# Patient Record
Sex: FEMALE | Race: WHITE | NOT HISPANIC OR LATINO
[De-identification: names, ages, dates, MRNs, and addresses within clinical notes are randomized per-mention and may not be internally consistent; named-entity substitution may affect disease eponyms.]

---

## 2017-01-24 ENCOUNTER — CHART COPY (OUTPATIENT)
Age: 67
End: 2017-01-24

## 2017-10-20 ENCOUNTER — APPOINTMENT (OUTPATIENT)
Dept: ORTHOPEDIC SURGERY | Facility: CLINIC | Age: 67
End: 2017-10-20

## 2018-07-16 ENCOUNTER — RESULT REVIEW (OUTPATIENT)
Age: 68
End: 2018-07-16

## 2019-03-12 ENCOUNTER — EMERGENCY (EMERGENCY)
Facility: HOSPITAL | Age: 69
LOS: 1 days | Discharge: HOME | End: 2019-03-12
Attending: EMERGENCY MEDICINE
Payer: MEDICARE

## 2019-03-12 VITALS
RESPIRATION RATE: 22 BRPM | WEIGHT: 125 LBS | HEART RATE: 69 BPM | SYSTOLIC BLOOD PRESSURE: 154 MMHG | TEMPERATURE: 99 F | OXYGEN SATURATION: 98 % | HEIGHT: 68 IN | DIASTOLIC BLOOD PRESSURE: 95 MMHG

## 2019-03-12 DIAGNOSIS — Z87.81 PERSONAL HISTORY OF (HEALED) TRAUMATIC FRACTURE: Chronic | ICD-10-CM

## 2019-03-12 DIAGNOSIS — Z98.89 OTHER SPECIFIED POSTPROCEDURAL STATES: Chronic | ICD-10-CM

## 2019-03-12 PROCEDURE — 99284 EMERGENCY DEPT VISIT MOD MDM: CPT

## 2019-03-12 NOTE — ED ADULT TRIAGE NOTE - CHIEF COMPLAINT QUOTE
Swelling and deformity of left wrist. denies any recent injury or fall. recently had repair in January for fracture to wrist

## 2019-03-13 VITALS
HEART RATE: 94 BPM | SYSTOLIC BLOOD PRESSURE: 171 MMHG | RESPIRATION RATE: 20 BRPM | OXYGEN SATURATION: 97 % | TEMPERATURE: 98 F | DIASTOLIC BLOOD PRESSURE: 94 MMHG

## 2019-03-13 PROCEDURE — 73110 X-RAY EXAM OF WRIST: CPT | Mod: 26,LT

## 2019-03-13 PROCEDURE — 73110 X-RAY EXAM OF WRIST: CPT

## 2019-03-13 PROCEDURE — 73100 X-RAY EXAM OF WRIST: CPT | Mod: 26,59,LT

## 2019-03-13 PROCEDURE — 99284 EMERGENCY DEPT VISIT MOD MDM: CPT | Mod: 25

## 2019-03-13 PROCEDURE — 25605 CLTX DST RDL FX/EPHYS SEP W/: CPT | Mod: LT

## 2019-03-13 PROCEDURE — 73100 X-RAY EXAM OF WRIST: CPT

## 2019-03-13 RX ORDER — OXYCODONE AND ACETAMINOPHEN 5; 325 MG/1; MG/1
1 TABLET ORAL
Qty: 12 | Refills: 0
Start: 2019-03-13 | End: 2019-03-15

## 2019-03-13 RX ORDER — HYDROMORPHONE HYDROCHLORIDE 2 MG/ML
0.5 INJECTION INTRAMUSCULAR; INTRAVENOUS; SUBCUTANEOUS ONCE
Qty: 0 | Refills: 0 | Status: DISCONTINUED | OUTPATIENT
Start: 2019-03-13 | End: 2019-03-13

## 2019-03-13 RX ADMIN — HYDROMORPHONE HYDROCHLORIDE 0.5 MILLIGRAM(S): 2 INJECTION INTRAMUSCULAR; INTRAVENOUS; SUBCUTANEOUS at 02:46

## 2019-03-13 NOTE — ED PROVIDER NOTE - OBJECTIVE STATEMENT
67 y/o F pt with hx of arthritis, HTN, hypothyroidism, nad R wrist fracture presents to ED c/o L wrist pain and swelling that onset suddenly at 16:00. Pt states there was no new trauma to the area today. She reports a fall in January, injured L wrist, but she had a negative X-ray at urgent care at that time. Pt is R-handed. Denies fever, chills, CP, SOB, abd pain, n/v/d, numbness and tingling. No further complaints at this time.

## 2019-03-13 NOTE — ED ADULT NURSE NOTE - OBJECTIVE STATEMENT
pt alert and oriente x4 came in c/o pain, swelling echymosis and deformity to left wrist. pt denies trauma to wrist. respirations even unlabored. pt educated on plan of care, pt able to successfully teach back plan of care to RN, RN will continue to reeducate pt during hospital stay.

## 2019-03-13 NOTE — CONSULT NOTE ADULT - SUBJECTIVE AND OBJECTIVE BOX
ORTHO-HAND SERVICE    Pt Name: SUSANNAH LEE    MRN: [id number]      Patient is a 68y Female presenting to the emergency department with a chief complaint of left wrist pain/deformity x 1 day. Pt states that she was home when she suddenly noticed pain/deformity of her left wrist x 4 hours ago. Pt denies any recent falls/trauma and states that she is unsure of how she caused this injury to her wrist. Pt otherwise denies numbness/tingling and has no other complaints.      Patient presents for evaluation of Left distal radius fracture.      REVIEW OF SYSTEMS    General: Alert, responsive, in NAD    Skin/Breast: No rashes, no pruritis   	  Ophthalmologic: No visual changes. No redness.   	  ENMT:	No discharge. No swelling.    Respiratory and Thorax: No difficulty breathing. No cough.  	   Cardiovascular: No chest pain. No palpitations.    Gastrointestinal: No abdominal pain. No diarrhea.     Genitourinary: No dysuria. No bleeding.    Musculoskeletal: SEE HPI.    Neurological: No sensory or motor changes.     Psychiatric: No anxiety or depression.    Hematology/Lymphatics: No swelling.    Endocrine: No Hx of diabetes.    ROS is otherwise negative.    PAST MEDICAL & SURGICAL HISTORY:  PAST MEDICAL & SURGICAL HISTORY:  Anticardiolipin antibody positive  Urinary tract infection: 1/31/16 finished bactrim  Arthritis  Psoriasis  Hypothyroid  Hypertension  Avascular necrosis: left knee  H/O: knee surgery: right knee cyst  History of wrist fracture: right wrist orif      Allergies: No Known Allergies      Medications:     FAMILY HISTORY:  Family history of renal cancer (Mother)  : non-contributory    Social History: Denies ETOH abuse.      Daily Height in cm: 172.72 (12 Mar 2019 23:33)    Daily                     PHYSICAL EXAM:      Appearance: Alert, responsive, in no acute distress.    Left upper extremity: Sensation is grossly intact to light touch. no rash on visible skin. Skin is clean, dry and intact. No bleeding. No abrasions. No ulcerations. Well healed scar and the distal volar wrist from previous injury. +diffuse ecchymosis of the distal wrist with moderate swelling noted. 2+ distal radiaul pulse. Cap refill < 2 sec. No signs of venous  insufficiency  or stasis. No extremity ulcerations. No cyanosis. Decreased ROM of the wrist due to reported pain/injury. +flexion/extension of shoulder, +flexion/extension of elbow, +flexion/extension/abduction/adduction of all digits, No TTP noted in shoudler/elbow/forearm/digits. Compartments soft and compressible.       Imaging Studies: All images reviewed with Dr. Johnson.    Procedure: FRACTURE REDUCTION  PROCEDURE NOTE: Fracture reduction     Performed by:  Ashish Paulino PA-C    Indication: Acute fracture with displacement, requiring fracture reduction.    Consent: The risks and benefits of the procedure including incomplete reduction, nerve damage and bleeding were explained and the patient verbalized their understanding and wished to proceed with the procedure. Written consent was obtained following the discussion.    Universal Protocol: a time out was performed and the correct patient and site were verified     Procedure: Neurovascular exam intact prior to fracture reduction.  Skin exam : No bleeding or lacerations at the fracture site. Anesthesia/pain control, using aseptic technique, was administered using a hematoma block of 10 ml of 1% lidocaine. Reduction of the left distal raidus was accomplished via axial traction and careful manipulation. Following adequate reduction and alignment of the fractured bone, the fracture was immobilized with a well padded sugar tong plaster splint & sling. Distally, the extremity was neurovascular intact following the procedure.  The patient tolerated the procedure well.    Post reduction films obtained and demonstrated an adequate reduction.    Complications: None     A/P:  Pt is a  68y Female with a left comminuted distal radius fracture of unknown mechanism.    PLAN d/w Dr. Johnson:   * Adequate reduction achieved and well padded splint/sling applied of the LUE  * NWB of the LUE   * Maintain splint at all times, do not wet  * Sling for comfort  * Recommend strict elevation of the LUE  * Follow up with Dr. Perez in the office within 1 week, call tomorrow to schedule appointment  * Return to the ED for any concerning symptoms as discussed bedside with patient & daughter including increased pain, swelling, numbness/tingling, change in color of fingers.

## 2019-03-13 NOTE — ED PROVIDER NOTE - PMH
Anticardiolipin antibody positive    Arthritis    Avascular necrosis  left knee  Hypertension    Hypothyroid    Psoriasis    Urinary tract infection  1/31/16 finished bactrim

## 2019-03-13 NOTE — ED PROVIDER NOTE - PROGRESS NOTE DETAILS
PA NOTE: PA called to the bedside by attending and ORTHO PA to assist in perform procedure PA completed as described in proc note, meds and xrays ordered above, care was transferred back to attending after completion. Post-reduction films seen by Ortho Attending/Dr. Johnson  and pt cleared for d/c with f/u as outpt

## 2019-03-19 ENCOUNTER — APPOINTMENT (OUTPATIENT)
Dept: ORTHOPEDIC SURGERY | Facility: CLINIC | Age: 69
End: 2019-03-19
Payer: MEDICARE

## 2019-03-19 VITALS
SYSTOLIC BLOOD PRESSURE: 163 MMHG | HEIGHT: 65 IN | HEART RATE: 97 BPM | WEIGHT: 145 LBS | BODY MASS INDEX: 24.16 KG/M2 | DIASTOLIC BLOOD PRESSURE: 83 MMHG

## 2019-03-19 PROCEDURE — 99214 OFFICE O/P EST MOD 30 MIN: CPT | Mod: 25

## 2019-03-19 PROCEDURE — 73110 X-RAY EXAM OF WRIST: CPT | Mod: LT

## 2019-03-19 PROCEDURE — 29075 APPL CST ELBW FNGR SHORT ARM: CPT | Mod: LT

## 2019-03-19 RX ORDER — AMOXICILLIN 500 MG/1
500 CAPSULE ORAL
Qty: 20 | Refills: 4 | Status: DISCONTINUED | COMMUNITY
Start: 2017-01-24 | End: 2019-03-19

## 2019-03-20 ENCOUNTER — OTHER (OUTPATIENT)
Age: 69
End: 2019-03-20

## 2019-03-20 RX ORDER — TRAMADOL HYDROCHLORIDE 50 MG/1
50 TABLET, COATED ORAL
Qty: 12 | Refills: 0 | Status: ACTIVE | COMMUNITY
Start: 2019-03-20 | End: 1900-01-01

## 2019-03-22 ENCOUNTER — APPOINTMENT (OUTPATIENT)
Dept: CT IMAGING | Facility: CLINIC | Age: 69
End: 2019-03-22
Payer: MEDICARE

## 2019-03-22 ENCOUNTER — APPOINTMENT (OUTPATIENT)
Dept: CT IMAGING | Facility: CLINIC | Age: 69
End: 2019-03-22

## 2019-03-22 ENCOUNTER — OUTPATIENT (OUTPATIENT)
Dept: OUTPATIENT SERVICES | Facility: HOSPITAL | Age: 69
LOS: 1 days | End: 2019-03-22
Payer: MEDICARE

## 2019-03-22 DIAGNOSIS — Z87.81 PERSONAL HISTORY OF (HEALED) TRAUMATIC FRACTURE: Chronic | ICD-10-CM

## 2019-03-22 DIAGNOSIS — S52.532A COLLES' FRACTURE OF LEFT RADIUS, INITIAL ENCOUNTER FOR CLOSED FRACTURE: ICD-10-CM

## 2019-03-22 DIAGNOSIS — Z98.89 OTHER SPECIFIED POSTPROCEDURAL STATES: Chronic | ICD-10-CM

## 2019-03-22 PROCEDURE — 76377 3D RENDER W/INTRP POSTPROCES: CPT

## 2019-03-22 PROCEDURE — 76377 3D RENDER W/INTRP POSTPROCES: CPT | Mod: 26

## 2019-03-22 PROCEDURE — 73200 CT UPPER EXTREMITY W/O DYE: CPT | Mod: 26,LT

## 2019-03-22 PROCEDURE — 73200 CT UPPER EXTREMITY W/O DYE: CPT

## 2019-03-25 ENCOUNTER — OTHER (OUTPATIENT)
Age: 69
End: 2019-03-25

## 2019-04-02 ENCOUNTER — APPOINTMENT (OUTPATIENT)
Dept: ORTHOPEDIC SURGERY | Facility: CLINIC | Age: 69
End: 2019-04-02
Payer: MEDICARE

## 2019-04-02 PROCEDURE — 73110 X-RAY EXAM OF WRIST: CPT | Mod: LT

## 2019-04-02 PROCEDURE — 99214 OFFICE O/P EST MOD 30 MIN: CPT

## 2019-05-07 ENCOUNTER — APPOINTMENT (OUTPATIENT)
Dept: ORTHOPEDIC SURGERY | Facility: CLINIC | Age: 69
End: 2019-05-07
Payer: MEDICARE

## 2019-05-07 DIAGNOSIS — S52.532A COLLES' FRACTURE OF LEFT RADIUS, INITIAL ENCOUNTER FOR CLOSED FRACTURE: ICD-10-CM

## 2019-05-07 PROCEDURE — 99214 OFFICE O/P EST MOD 30 MIN: CPT

## 2019-05-07 PROCEDURE — 73110 X-RAY EXAM OF WRIST: CPT | Mod: LT

## 2019-06-17 ENCOUNTER — APPOINTMENT (OUTPATIENT)
Dept: ORTHOPEDIC SURGERY | Facility: CLINIC | Age: 69
End: 2019-06-17

## 2022-11-26 ENCOUNTER — EMERGENCY (EMERGENCY)
Facility: HOSPITAL | Age: 72
LOS: 1 days | Discharge: HOME | End: 2022-11-26
Attending: EMERGENCY MEDICINE
Payer: MEDICARE

## 2022-11-26 VITALS
SYSTOLIC BLOOD PRESSURE: 116 MMHG | RESPIRATION RATE: 18 BRPM | OXYGEN SATURATION: 95 % | DIASTOLIC BLOOD PRESSURE: 77 MMHG | WEIGHT: 160.06 LBS | TEMPERATURE: 98 F | HEIGHT: 65 IN | HEART RATE: 98 BPM

## 2022-11-26 DIAGNOSIS — Z98.89 OTHER SPECIFIED POSTPROCEDURAL STATES: Chronic | ICD-10-CM

## 2022-11-26 DIAGNOSIS — Z87.81 PERSONAL HISTORY OF (HEALED) TRAUMATIC FRACTURE: Chronic | ICD-10-CM

## 2022-11-26 LAB
ALBUMIN SERPL ELPH-MCNC: 4.7 G/DL — SIGNIFICANT CHANGE UP (ref 3.3–5.2)
ALP SERPL-CCNC: 83 U/L — SIGNIFICANT CHANGE UP (ref 40–120)
ALT FLD-CCNC: 11 U/L — SIGNIFICANT CHANGE UP
ANION GAP SERPL CALC-SCNC: 13 MMOL/L — SIGNIFICANT CHANGE UP (ref 5–17)
APTT BLD: 30.4 SEC — SIGNIFICANT CHANGE UP (ref 27.5–35.5)
AST SERPL-CCNC: 19 U/L — SIGNIFICANT CHANGE UP
BASOPHILS # BLD AUTO: 0.09 K/UL — SIGNIFICANT CHANGE UP (ref 0–0.2)
BASOPHILS NFR BLD AUTO: 0.9 % — SIGNIFICANT CHANGE UP (ref 0–2)
BILIRUB SERPL-MCNC: 0.3 MG/DL — LOW (ref 0.4–2)
BUN SERPL-MCNC: 20.9 MG/DL — HIGH (ref 8–20)
CALCIUM SERPL-MCNC: 9.1 MG/DL — SIGNIFICANT CHANGE UP (ref 8.4–10.5)
CHLORIDE SERPL-SCNC: 103 MMOL/L — SIGNIFICANT CHANGE UP (ref 96–108)
CO2 SERPL-SCNC: 23 MMOL/L — SIGNIFICANT CHANGE UP (ref 22–29)
CREAT SERPL-MCNC: 0.8 MG/DL — SIGNIFICANT CHANGE UP (ref 0.5–1.3)
EGFR: 79 ML/MIN/1.73M2 — SIGNIFICANT CHANGE UP
EOSINOPHIL # BLD AUTO: 0.15 K/UL — SIGNIFICANT CHANGE UP (ref 0–0.5)
EOSINOPHIL NFR BLD AUTO: 1.4 % — SIGNIFICANT CHANGE UP (ref 0–6)
FLUAV AG NPH QL: SIGNIFICANT CHANGE UP
FLUBV AG NPH QL: SIGNIFICANT CHANGE UP
GLUCOSE SERPL-MCNC: 96 MG/DL — SIGNIFICANT CHANGE UP (ref 70–99)
HCT VFR BLD CALC: 45.6 % — HIGH (ref 34.5–45)
HGB BLD-MCNC: 14.7 G/DL — SIGNIFICANT CHANGE UP (ref 11.5–15.5)
IMM GRANULOCYTES NFR BLD AUTO: 0.5 % — SIGNIFICANT CHANGE UP (ref 0–0.9)
INR BLD: 1.12 RATIO — SIGNIFICANT CHANGE UP (ref 0.88–1.16)
LYMPHOCYTES # BLD AUTO: 2.08 K/UL — SIGNIFICANT CHANGE UP (ref 1–3.3)
LYMPHOCYTES # BLD AUTO: 20.1 % — SIGNIFICANT CHANGE UP (ref 13–44)
MCHC RBC-ENTMCNC: 28.1 PG — SIGNIFICANT CHANGE UP (ref 27–34)
MCHC RBC-ENTMCNC: 32.2 GM/DL — SIGNIFICANT CHANGE UP (ref 32–36)
MCV RBC AUTO: 87.2 FL — SIGNIFICANT CHANGE UP (ref 80–100)
MONOCYTES # BLD AUTO: 0.53 K/UL — SIGNIFICANT CHANGE UP (ref 0–0.9)
MONOCYTES NFR BLD AUTO: 5.1 % — SIGNIFICANT CHANGE UP (ref 2–14)
NEUTROPHILS # BLD AUTO: 7.47 K/UL — HIGH (ref 1.8–7.4)
NEUTROPHILS NFR BLD AUTO: 72 % — SIGNIFICANT CHANGE UP (ref 43–77)
PLATELET # BLD AUTO: 395 K/UL — SIGNIFICANT CHANGE UP (ref 150–400)
POTASSIUM SERPL-MCNC: 4.2 MMOL/L — SIGNIFICANT CHANGE UP (ref 3.5–5.3)
POTASSIUM SERPL-SCNC: 4.2 MMOL/L — SIGNIFICANT CHANGE UP (ref 3.5–5.3)
PROT SERPL-MCNC: 7.6 G/DL — SIGNIFICANT CHANGE UP (ref 6.6–8.7)
PROTHROM AB SERPL-ACNC: 13 SEC — SIGNIFICANT CHANGE UP (ref 10.5–13.4)
RBC # BLD: 5.23 M/UL — HIGH (ref 3.8–5.2)
RBC # FLD: 13.2 % — SIGNIFICANT CHANGE UP (ref 10.3–14.5)
RSV RNA NPH QL NAA+NON-PROBE: SIGNIFICANT CHANGE UP
SARS-COV-2 RNA SPEC QL NAA+PROBE: SIGNIFICANT CHANGE UP
SODIUM SERPL-SCNC: 139 MMOL/L — SIGNIFICANT CHANGE UP (ref 135–145)
WBC # BLD: 10.37 K/UL — SIGNIFICANT CHANGE UP (ref 3.8–10.5)
WBC # FLD AUTO: 10.37 K/UL — SIGNIFICANT CHANGE UP (ref 3.8–10.5)

## 2022-11-26 PROCEDURE — 72192 CT PELVIS W/O DYE: CPT | Mod: 26,MG

## 2022-11-26 PROCEDURE — G1004: CPT

## 2022-11-26 PROCEDURE — 99220: CPT

## 2022-11-26 PROCEDURE — 93971 EXTREMITY STUDY: CPT | Mod: 26,LT

## 2022-11-26 RX ORDER — NICOTINE POLACRILEX 2 MG
1 GUM BUCCAL DAILY
Refills: 0 | Status: DISCONTINUED | OUTPATIENT
Start: 2022-11-26 | End: 2022-12-03

## 2022-11-26 RX ORDER — LIDOCAINE 4 G/100G
1 CREAM TOPICAL DAILY
Refills: 0 | Status: DISCONTINUED | OUTPATIENT
Start: 2022-11-26 | End: 2022-12-03

## 2022-11-26 RX ORDER — SERTRALINE 25 MG/1
100 TABLET, FILM COATED ORAL DAILY
Refills: 0 | Status: DISCONTINUED | OUTPATIENT
Start: 2022-11-26 | End: 2022-12-03

## 2022-11-26 RX ORDER — OXYCODONE HYDROCHLORIDE 5 MG/1
10 TABLET ORAL EVERY 8 HOURS
Refills: 0 | Status: DISCONTINUED | OUTPATIENT
Start: 2022-11-26 | End: 2022-11-26

## 2022-11-26 RX ORDER — LEVOTHYROXINE SODIUM 125 MCG
50 TABLET ORAL DAILY
Refills: 0 | Status: DISCONTINUED | OUTPATIENT
Start: 2022-11-26 | End: 2022-12-03

## 2022-11-26 RX ORDER — IBUPROFEN 200 MG
600 TABLET ORAL EVERY 6 HOURS
Refills: 0 | Status: DISCONTINUED | OUTPATIENT
Start: 2022-11-26 | End: 2022-12-03

## 2022-11-26 RX ORDER — HYDROMORPHONE HYDROCHLORIDE 2 MG/ML
1 INJECTION INTRAMUSCULAR; INTRAVENOUS; SUBCUTANEOUS ONCE
Refills: 0 | Status: DISCONTINUED | OUTPATIENT
Start: 2022-11-26 | End: 2022-11-26

## 2022-11-26 RX ORDER — METHOCARBAMOL 500 MG/1
750 TABLET, FILM COATED ORAL THREE TIMES A DAY
Refills: 0 | Status: DISCONTINUED | OUTPATIENT
Start: 2022-11-26 | End: 2022-12-03

## 2022-11-26 RX ORDER — MORPHINE SULFATE 50 MG/1
4 CAPSULE, EXTENDED RELEASE ORAL ONCE
Refills: 0 | Status: DISCONTINUED | OUTPATIENT
Start: 2022-11-26 | End: 2022-11-26

## 2022-11-26 RX ORDER — OXYCODONE HYDROCHLORIDE 5 MG/1
5 TABLET ORAL EVERY 6 HOURS
Refills: 0 | Status: DISCONTINUED | OUTPATIENT
Start: 2022-11-26 | End: 2022-11-27

## 2022-11-26 RX ADMIN — SERTRALINE 100 MILLIGRAM(S): 25 TABLET, FILM COATED ORAL at 21:48

## 2022-11-26 RX ADMIN — OXYCODONE HYDROCHLORIDE 10 MILLIGRAM(S): 5 TABLET ORAL at 21:46

## 2022-11-26 RX ADMIN — LIDOCAINE 1 PATCH: 4 CREAM TOPICAL at 22:45

## 2022-11-26 RX ADMIN — Medication 10 MILLIGRAM(S): at 23:53

## 2022-11-26 RX ADMIN — HYDROMORPHONE HYDROCHLORIDE 1 MILLIGRAM(S): 2 INJECTION INTRAMUSCULAR; INTRAVENOUS; SUBCUTANEOUS at 16:09

## 2022-11-26 RX ADMIN — Medication 50 MICROGRAM(S): at 22:04

## 2022-11-26 RX ADMIN — MORPHINE SULFATE 4 MILLIGRAM(S): 50 CAPSULE, EXTENDED RELEASE ORAL at 13:10

## 2022-11-26 RX ADMIN — HYDROMORPHONE HYDROCHLORIDE 1 MILLIGRAM(S): 2 INJECTION INTRAMUSCULAR; INTRAVENOUS; SUBCUTANEOUS at 15:54

## 2022-11-26 RX ADMIN — MORPHINE SULFATE 4 MILLIGRAM(S): 50 CAPSULE, EXTENDED RELEASE ORAL at 12:55

## 2022-11-26 RX ADMIN — Medication 600 MILLIGRAM(S): at 23:55

## 2022-11-26 RX ADMIN — METHOCARBAMOL 750 MILLIGRAM(S): 500 TABLET, FILM COATED ORAL at 21:48

## 2022-11-26 NOTE — ED ADULT TRIAGE NOTE - CHIEF COMPLAINT QUOTE
pt sent from urgent care due to left hip fracture. pt denies fall/ injury. pt states she started experiencing pain 5 days ago.

## 2022-11-26 NOTE — ED ADULT NURSE NOTE - NSICDXPASTMEDICALHX_GEN_ALL_CORE_FT
PAST MEDICAL HISTORY:  Anticardiolipin antibody positive     Arthritis     Avascular necrosis left knee    Hypertension     Hypothyroid     Psoriasis     Urinary tract infection 1/31/16 finished bactrim

## 2022-11-26 NOTE — ED CDU PROVIDER INITIAL DAY NOTE - MEDICAL DECISION MAKING DETAILS
73 yo F co L hip pain. Pt states that for the past 5 d she has had constant L hip pain. Pt states that the pain is lateral and worse with movement and has been unable to walk or stand. [facility] and was told it was possibly fx'd. negative CT     pain control-   PT eval   pain management  consult   case management   ambulate with assistant as tolerated  us left LE

## 2022-11-26 NOTE — ED PROVIDER NOTE - OBJECTIVE STATEMENT
pt is a 71 yo F co L hip pain. Pt states that for the past 5 d she has had constant L hip pain. Pt states that the pain is lateral and worse with movement and has been unable to walk or stand. [facility] and was told it was possibly fx'd. XR report reads as possible subtle femoral neck fx v. overlying bone.

## 2022-11-26 NOTE — ED CDU PROVIDER INITIAL DAY NOTE - PHYSICAL EXAMINATION
Const: AOX3 nontoxic appearing, no apparent respiratory or physical distress. lay on stretcher   Eyes: JEN. EOMI  Neck: Soft and supple. Full ROM without pain.  Cardiac: Regular rate and regular rhythm. +S1/S2. Peripheral LE pulses 2+ and symmetric. No LE edema.  Resp: Speaking in full sentences. No evidence of respiratory distress.   Abd: Soft, non-tender, non-distended. Normal bowel sounds in all 4 quadrants. No guarding or rebound.  Back: Spine midline and non-tender. No CVAT.  MSK: S.P left knee TKR  ROM grossly intact   LEFT HIP mild gluteal mild TTP . pain on ROM - DP pulse +2 b.l   Skin: No rashes, abrasions or lacerations.  Neuro: Awake, alert & oriented x 3. Moves all extremities symmetrically.

## 2022-11-26 NOTE — ED CDU PROVIDER INITIAL DAY NOTE - OBJECTIVE STATEMENT
71 yo F co L hip pain. Pt states that for the past 5 d she has had constant L hip pain. Pt states that the pain is lateral and worse with movement and has been unable to walk or stand. [facility] and was told it was possibly fx'd. XR report reads as possible subtle femoral neck fx v. overlying bone. she denies any fall or trauma or injury - she feeling heavy on the leg as well on walking with some numbness. she  was walking since then with pain . she took Advil that take off the edge of the pain

## 2022-11-26 NOTE — ED PROVIDER NOTE - OBSERVING MD:
Group Therapy Note    Start Time: 800  End Time:  342  Number of Participants: 13    Type of Group: Community Meeting       Patient's Goal:  \"My goals after release\"      Notes:        Participation Level:  Active Listener       Participation Quality: Appropriate      Thought Process/Content: Logical      Affective Functioning: Congruent      Mood: Calm      Level of consciousness:  Alert      Modes of Intervention: Support      Discipline Responsible: Behavioral Health Tech II      Signature:  [address] Esquivel

## 2022-11-26 NOTE — ED PROVIDER NOTE - PHYSICAL EXAMINATION
Constitutional - well-developed.   Head - NCAT. Airway patent.   Eyes - PERRL.   CV - RRR. no murmur. no edema.   Pulm - CTAB.   Abd - soft, nt. no rebound. no guarding.   Neuro - A&Ox3. strength 5/5 x4. sensation intact x4. normal gait.   Skin - No rash. .  MSK - pain on ROM of L hip.

## 2022-11-26 NOTE — ED ADULT NURSE NOTE - OBJECTIVE STATEMENT
Pt sent in from urgent care s/p non traumatic left hip fracture Pt sent in from urgent care s/p non traumatic left hip fracture.  Pt states pain began 5 days ago and denies any injury.  No obvious deformity noted to pt.  Pt unable to ambulate r/t pain.

## 2022-11-26 NOTE — ED ADULT NURSE NOTE - NSFALLRSKASSESASSIST_ED_ALL_ED
Quality 110: Preventive Care And Screening: Influenza Immunization: Influenza Immunization previously received during influenza season Quality 111:Pneumonia Vaccination Status For Older Adults: Pneumococcal Vaccination Previously Received Detail Level: Detailed yes

## 2022-11-26 NOTE — ED CDU PROVIDER INITIAL DAY NOTE - NS ED ATTENDING STATEMENT MOD
This was a shared visit with the GOYO. I reviewed and verified the documentation and independently performed the documented:

## 2022-11-26 NOTE — ED ADULT NURSE NOTE - NSICDXPASTSURGICALHX_GEN_ALL_CORE_FT
PAST SURGICAL HISTORY:  H/O: knee surgery right knee cyst    History of wrist fracture right wrist orif

## 2022-11-26 NOTE — ED ADULT NURSE NOTE - NSICDXFAMILYHX_GEN_ALL_CORE_FT
FAMILY HISTORY:  Mother  Still living? No  Family history of renal cancer, Age at diagnosis: Age Unknown

## 2022-11-26 NOTE — ED ADULT NURSE NOTE - NS ED NOTE ABUSE RESPONSE YN
History  Chief Complaint   Patient presents with    Diarrhea     Patient reports diarrhea and vomiting since Monday morning  Patient states she feels fine, she just wants a note to back to work tomorrow     HPI    None       History reviewed  No pertinent past medical history  History reviewed  No pertinent surgical history  History reviewed  No pertinent family history  I have reviewed and agree with the history as documented  Social History   Substance Use Topics    Smoking status: Current Every Day Smoker     Packs/day: 0 20     Types: Cigarettes    Smokeless tobacco: Never Used    Alcohol use Yes        Review of Systems    Physical Exam  ED Triage Vitals [02/21/18 2159]   Temperature Pulse Respirations Blood Pressure SpO2   98 3 °F (36 8 °C) 75 18 149/72 97 %      Temp Source Heart Rate Source Patient Position - Orthostatic VS BP Location FiO2 (%)   Oral Monitor Sitting Left arm --      Pain Score       5           Orthostatic Vital Signs  Vitals:    02/21/18 2159   BP: 149/72   Pulse: 75   Patient Position - Orthostatic VS: Sitting       Physical Exam   Constitutional: She is oriented to person, place, and time  She appears well-developed and well-nourished  No distress  Appears well hydrated   HENT:   Head: Normocephalic and atraumatic  Mouth/Throat: Oropharynx is clear and moist    Eyes: Conjunctivae are normal  Pupils are equal, round, and reactive to light  Neck: Normal range of motion  No tracheal deviation present  Cardiovascular: Normal rate, regular rhythm, normal heart sounds and intact distal pulses  Pulmonary/Chest: Effort normal and breath sounds normal  No respiratory distress  Abdominal: Soft  Bowel sounds are normal  She exhibits no distension  There is tenderness (very mild) in the epigastric area  There is no rigidity, no rebound, no guarding and negative Chino's sign  Neurological: She is alert and oriented to person, place, and time  She has normal strength  GCS eye subscore is 4  GCS verbal subscore is 5  GCS motor subscore is 6  Skin: Skin is warm and dry  Psychiatric: She has a normal mood and affect  Her behavior is normal    Nursing note and vitals reviewed  ED Medications  Medications   ondansetron (ZOFRAN-ODT) dispersible tablet 4 mg (not administered)       Diagnostic Studies  Results Reviewed     None                 No orders to display              Procedures  Procedures       Phone Contacts  ED Phone Contact    ED Course  ED Course                                MDM  Number of Diagnoses or Management Options  Nausea vomiting and diarrhea: new and does not require workup  Diagnosis management comments: This 81-year-old female who presents here today with nausea, vomiting, and diarrhea day, looking for a work note  She states she began vomiting and having diarrhea on 2/18  She states she is having both frequently, vomiting frequently after eating or drinking, and when she managed to eat it "goes right through me "  She had "low-grade" fevers earlier in the week, up to 100 5, but no other fevers  She has had mild nasal congestion, but no significant coughing  She denies dysuria or changes in her urine, and states her last menstrual period was about a week ago and was normal and regular for her  She has known exposure to influenza, but denies any known exposure to GI illnesses  She has been drinking ginger ale, and eating caramelized ginger candies which has been helping with her vomiting  She has not taken or done anything else for her symptoms  She does work in   She states she is doing somewhat better today that she has managed to keep down some fluids and food without vomiting  However, her place of employment says that she needs a note saying she does not have the flu, and that she can go back to work  She denies any underlying medical problems, known bad food exposures, or prior abdominal surgeries      ROS: Otherwise negative, unless stated as above  She is well-appearing, in no acute distress  She has minimal epigastric tenderness to palpation without Chino sign or peritoneal signs  The remainder of her exam is unremarkable  She does appear well hydrated given the reported amount vomiting and diarrhea she has been having  I discussed with her that given her primary GI symptoms and no significant fevers it is unlikely that she has influenza  However, as she works in , she should not be going to work where she can easily spread this illness to other people  Given her home lack of significant abdominal pain or tenderness I have lower suspicion for a pancreatitis, cholelithiasis, cholecystitis, diverticulitis, colitis  We will give her Zofran to help with her nausea and vomiting, and give her a prescription for this at home  I advised that she should not go back to work for at least 24 hours after she is done with nausea and vomiting, so cannot go back tomorrow, but as long as she is doing better can go back today after that  I discussed with her worsening or changing symptoms for which she should return, that suggest that there is more than just a viral GI illness contributing to her symptoms, follow-up, and she expresses understanding with this plan  CritCare Time    Disposition  Final diagnoses:   Nausea vomiting and diarrhea     Time reflects when diagnosis was documented in both MDM as applicable and the Disposition within this note     Time User Action Codes Description Comment    2/21/2018 11:06 PM Ivonne Shepherd Add [R11 2,  R19 7] Nausea vomiting and diarrhea       ED Disposition     ED Disposition Condition Comment    Discharge  Garth Cavanaugh discharge to home/self care      Condition at discharge: Good        Follow-up Information     Follow up With Specialties Details Why Contact Info    your primary care doctor  Schedule an appointment as soon as possible for a visit As needed, to follow up on your symptoms     Marie Yanez, DO Internal Medicine Schedule an appointment as soon as possible for a visit to set up care with a primary care doctor [address] [facility]          Patient's Medications   Discharge Prescriptions    ONDANSETRON (ZOFRAN-ODT) 4 MG DISINTEGRATING TABLET    Take 1 tablet (4 mg total) by mouth every 6 (six) hours as needed for nausea or vomiting       Start Date: 2/21/2018 End Date: --       Order Dose: 4 mg       Quantity: 20 tablet    Refills: 0     No discharge procedures on file      ED Provider  Electronically Signed by           Lisa Silva MD  02/21/18 4192 Yes

## 2022-11-26 NOTE — ED PROVIDER NOTE - ATTENDING CONTRIBUTION TO CARE
Pt with 5 d of constant L hip pain. Pt states that the pain is worse with movement.  Pt went to [facility] and was told it was fx'd and to come to the hospital.    physical - L hip with pain on ROM. nvi distally.    plan - ct reviewed and neg. will put in obs pending pt and pain management.

## 2022-11-26 NOTE — ED CDU PROVIDER INITIAL DAY NOTE - ATTENDING APP SHARED VISIT CONTRIBUTION OF CARE
72F p/w left hip pain, worse with movment and difficulty ambulating 2/2 pain.  xray concerning for possible fx.   EXAM:   L LE: ttp @ left hip, from/nt @ knee/ankle.  nv intact  A/P:  72yoF p/w left hip pain  -pain control, PT eval, CM consult

## 2022-11-27 VITALS
SYSTOLIC BLOOD PRESSURE: 110 MMHG | OXYGEN SATURATION: 95 % | DIASTOLIC BLOOD PRESSURE: 72 MMHG | HEART RATE: 81 BPM | TEMPERATURE: 98 F | RESPIRATION RATE: 18 BRPM

## 2022-11-27 PROCEDURE — 87637 SARSCOV2&INF A&B&RSV AMP PRB: CPT

## 2022-11-27 PROCEDURE — 86850 RBC ANTIBODY SCREEN: CPT

## 2022-11-27 PROCEDURE — 99284 EMERGENCY DEPT VISIT MOD MDM: CPT | Mod: 25

## 2022-11-27 PROCEDURE — 85610 PROTHROMBIN TIME: CPT

## 2022-11-27 PROCEDURE — 86900 BLOOD TYPING SEROLOGIC ABO: CPT

## 2022-11-27 PROCEDURE — 96374 THER/PROPH/DIAG INJ IV PUSH: CPT

## 2022-11-27 PROCEDURE — 72192 CT PELVIS W/O DYE: CPT | Mod: MG

## 2022-11-27 PROCEDURE — 86901 BLOOD TYPING SEROLOGIC RH(D): CPT

## 2022-11-27 PROCEDURE — 99217: CPT

## 2022-11-27 PROCEDURE — 85730 THROMBOPLASTIN TIME PARTIAL: CPT

## 2022-11-27 PROCEDURE — 36415 COLL VENOUS BLD VENIPUNCTURE: CPT

## 2022-11-27 PROCEDURE — G0378: CPT

## 2022-11-27 PROCEDURE — 80053 COMPREHEN METABOLIC PANEL: CPT

## 2022-11-27 PROCEDURE — G1004: CPT

## 2022-11-27 PROCEDURE — 93971 EXTREMITY STUDY: CPT

## 2022-11-27 PROCEDURE — 85025 COMPLETE CBC W/AUTO DIFF WBC: CPT

## 2022-11-27 RX ORDER — IBUPROFEN 200 MG
1 TABLET ORAL
Qty: 20 | Refills: 0
Start: 2022-11-27 | End: 2022-12-01

## 2022-11-27 RX ORDER — OXYCODONE AND ACETAMINOPHEN 5; 325 MG/1; MG/1
1 TABLET ORAL
Qty: 6 | Refills: 0
Start: 2022-11-27 | End: 2022-11-28

## 2022-11-27 RX ORDER — OXYCODONE AND ACETAMINOPHEN 5; 325 MG/1; MG/1
1 TABLET ORAL
Qty: 9 | Refills: 0
Start: 2022-11-27 | End: 2022-11-29

## 2022-11-27 RX ADMIN — LIDOCAINE 1 PATCH: 4 CREAM TOPICAL at 06:49

## 2022-11-27 RX ADMIN — METHOCARBAMOL 750 MILLIGRAM(S): 500 TABLET, FILM COATED ORAL at 05:53

## 2022-11-27 RX ADMIN — LIDOCAINE 1 PATCH: 4 CREAM TOPICAL at 13:16

## 2022-11-27 RX ADMIN — SERTRALINE 100 MILLIGRAM(S): 25 TABLET, FILM COATED ORAL at 13:51

## 2022-11-27 RX ADMIN — OXYCODONE HYDROCHLORIDE 5 MILLIGRAM(S): 5 TABLET ORAL at 05:53

## 2022-11-27 RX ADMIN — METHOCARBAMOL 750 MILLIGRAM(S): 500 TABLET, FILM COATED ORAL at 13:15

## 2022-11-27 RX ADMIN — Medication 600 MILLIGRAM(S): at 04:49

## 2022-11-27 RX ADMIN — OXYCODONE HYDROCHLORIDE 5 MILLIGRAM(S): 5 TABLET ORAL at 06:49

## 2022-11-27 RX ADMIN — OXYCODONE HYDROCHLORIDE 5 MILLIGRAM(S): 5 TABLET ORAL at 13:15

## 2022-11-27 RX ADMIN — LIDOCAINE 1 PATCH: 4 CREAM TOPICAL at 10:35

## 2022-11-27 RX ADMIN — Medication 10 MILLIGRAM(S): at 05:53

## 2022-11-27 NOTE — PHYSICAL THERAPY INITIAL EVALUATION ADULT - PERTINENT HX OF CURRENT PROBLEM, REHAB EVAL
71 yo F co L hip pain. Pt states that for the past 5 d she has had constant L hip pain. Pt states that the pain is lateral and worse with movement and has been unable to walk or stand. [facility] and was told it was possibly fx'd. negative CT

## 2022-11-27 NOTE — PHYSICAL THERAPY INITIAL EVALUATION ADULT - ADDITIONAL COMMENTS
Pt reports living alone in a house with 6-7 CINDI c 2 rails, and resides on the main level. Pt amb without device and is independent with functional mobility, ADLs, and IADLs (however reports with pain and difficulty). Pt drives and is retired. Pt has support of family (pt reports daughter can assist as needed- however works). Pt owns RW.

## 2022-11-27 NOTE — ED CDU PROVIDER DISPOSITION NOTE - NSFOLLOWUPINSTRUCTIONS_ED_ALL_ED_FT
- Return to the ED for any new or worsening symptoms.   - Use walker when ambulating  - Follow-up with Spine doctor listed

## 2022-11-27 NOTE — ED CDU PROVIDER SUBSEQUENT DAY NOTE - PHYSICAL EXAMINATION
Const: AOX3 nontoxic appearing, no apparent respiratory or physical distress. lay on stretcher   Eyes: JEN. EOMI  Neck: Soft and supple. Full ROM without pain.  Cardiac: Regular rate and regular rhythm. +S1/S2. Peripheral LE pulses 2+ and symmetric. No LE edema.  Resp: Speaking in full sentences. No evidence of respiratory distress.   Abd: Soft, non-tender, non-distended. Normal bowel sounds in all 4 quadrants. No guarding or rebound.  Back: Spine midline and non-tender. No CVAT.  MSK: S.P left knee TKR  ROM grossly intact   LEFT HIP mild gluteal mild TTP . pain on ROM - DP pulse +2 b.l equal   Skin: No rashes, abrasions or lacerations.  Neuro: Awake, alert & oriented x 3. Moves all extremities symmetrically.

## 2022-11-27 NOTE — ED CDU PROVIDER SUBSEQUENT DAY NOTE - MEDICAL DECISION MAKING DETAILS
71 yo F co L hip pain. Pt states that for the past 5 d she has had constant L hip pain. Pt states that the pain is lateral and worse with movement and has been unable to walk or stand. [facility] and was told it was possibly fx'd. negative CT     pain control-   PT eval   pain management  consult   case management   ambulate with assistant as tolerated  us left LE

## 2022-11-27 NOTE — ED CDU PROVIDER SUBSEQUENT DAY NOTE - HISTORY
pt is resting over night comfortably, pending Pian management and PT eval in AM . negative DVT study . vss over night

## 2022-11-27 NOTE — PHYSICAL THERAPY INITIAL EVALUATION ADULT - LEVEL OF INDEPENDENCE: SUPINE/SIT, REHAB EVAL
Initiate Treatment: 2% ketoconazole cream once daily
Continue Regimen: Fluconazole once weekly discussed sweating one hour after taking medication
Detail Level: Simple
independent

## 2022-11-27 NOTE — ED ADULT NURSE REASSESSMENT NOTE - NS ED NURSE REASSESS COMMENT FT1
PT at bedside for pt evaluation
Pt sleeping comfortably on stretcher.  No nonverbal indicators of pain present.  Respirations even and unlabored.  Awaiting further POC.  PIV wnl; flushing without difficulty.  In NAD, will continue to monitor.
report received from zakia BOWENS.  Pt resting comfortably on stretcher.  No complaints of pain.  Respirations even and unlabored.  Awaiting pain mgt and PT consult.  PIV wnl; flushing without difficulty.  In NAD, will continue to monitor.
Assumed care of pt at 2215 as stated in report from KWAN Hussein. Charting as noted. Patient A&O x4, denies pain/discomfort, denies CP/SOB. Updated on the plan of care. Call bell within reach, bed locked in lowest position. IV site flushed w/ NS. No redness, swelling or pain noted to site. No signs of acute distress noted, safety maintained.
pt resting comfortably showing no signs of respiratory distress or pain, the pt is calm and cooperative
pt resting comfortably showing no signs of respiratory distress or pain, the pt is calm and cooperative

## 2022-11-27 NOTE — PROVIDER CONTACT NOTE (OTHER) - RECOMMENDATIONS
Home c RW. Pt reports daughter works, however can provide assist as needed and standby for stairs as needed.

## 2022-11-27 NOTE — ED CDU PROVIDER SUBSEQUENT DAY NOTE - ATTENDING APP SHARED VISIT CONTRIBUTION OF CARE
Patient with atraumatic left hip pain.  No signs of fracture on imaging.  No signs of DVT.  No signs of infection.  Normal neurologic exam.  Evaluated by PT and cleared for discharge home with walker.  Will discharge with outpatient orthopedics follow-up and return precautions.  Patient comfortable with plan.

## 2022-11-27 NOTE — PROVIDER CONTACT NOTE (OTHER) - ASSESSMENT
Pt received on stretcher, pt amb and negotiated stairs, and returned with CBWR in NAD to stretcher.  Pt with Pre 10/10,  session  9/10, Post  9/10 pain levels at Left LE: RN Aware. Pt presents functionally independent, will not follow.

## 2022-11-27 NOTE — ED CDU PROVIDER DISPOSITION NOTE - CARE PROVIDER_API CALL
Ever Patel (DO)  Orthopaedic Surgery  [address] [place] [address]  Phone: [phone]  Fax: [phone]  Follow Up Time:

## 2022-12-05 ENCOUNTER — APPOINTMENT (OUTPATIENT)
Dept: ORTHOPEDIC SURGERY | Facility: CLINIC | Age: 72
End: 2022-12-05

## 2022-12-30 ENCOUNTER — APPOINTMENT (OUTPATIENT)
Dept: ORTHOPEDIC SURGERY | Facility: CLINIC | Age: 72
End: 2022-12-30
Payer: MEDICARE

## 2022-12-30 VITALS
DIASTOLIC BLOOD PRESSURE: 83 MMHG | BODY MASS INDEX: 24.16 KG/M2 | WEIGHT: 145 LBS | HEART RATE: 87 BPM | HEIGHT: 65 IN | SYSTOLIC BLOOD PRESSURE: 158 MMHG

## 2022-12-30 DIAGNOSIS — Z96.652 PRESENCE OF LEFT ARTIFICIAL KNEE JOINT: ICD-10-CM

## 2022-12-30 DIAGNOSIS — M16.12 UNILATERAL PRIMARY OSTEOARTHRITIS, LEFT HIP: ICD-10-CM

## 2022-12-30 PROCEDURE — 73501 X-RAY EXAM HIP UNI 1 VIEW: CPT

## 2022-12-30 PROCEDURE — 99203 OFFICE O/P NEW LOW 30 MIN: CPT

## 2022-12-30 RX ORDER — MELOXICAM 15 MG/1
15 TABLET ORAL
Qty: 30 | Refills: 1 | Status: ACTIVE | COMMUNITY
Start: 2022-12-30 | End: 1900-01-01

## 2023-01-04 ENCOUNTER — OUTPATIENT (OUTPATIENT)
Dept: OUTPATIENT SERVICES | Facility: HOSPITAL | Age: 73
LOS: 1 days | End: 2023-01-04
Payer: MEDICARE

## 2023-01-04 VITALS
DIASTOLIC BLOOD PRESSURE: 77 MMHG | HEART RATE: 86 BPM | RESPIRATION RATE: 16 BRPM | TEMPERATURE: 98 F | WEIGHT: 158.73 LBS | SYSTOLIC BLOOD PRESSURE: 132 MMHG | HEIGHT: 65 IN | OXYGEN SATURATION: 98 %

## 2023-01-04 DIAGNOSIS — Z29.9 ENCOUNTER FOR PROPHYLACTIC MEASURES, UNSPECIFIED: ICD-10-CM

## 2023-01-04 DIAGNOSIS — R76.8 OTHER SPECIFIED ABNORMAL IMMUNOLOGICAL FINDINGS IN SERUM: ICD-10-CM

## 2023-01-04 DIAGNOSIS — Z98.89 OTHER SPECIFIED POSTPROCEDURAL STATES: Chronic | ICD-10-CM

## 2023-01-04 DIAGNOSIS — Z01.818 ENCOUNTER FOR OTHER PREPROCEDURAL EXAMINATION: ICD-10-CM

## 2023-01-04 DIAGNOSIS — I10 ESSENTIAL (PRIMARY) HYPERTENSION: ICD-10-CM

## 2023-01-04 DIAGNOSIS — E03.9 HYPOTHYROIDISM, UNSPECIFIED: ICD-10-CM

## 2023-01-04 DIAGNOSIS — Z87.81 PERSONAL HISTORY OF (HEALED) TRAUMATIC FRACTURE: Chronic | ICD-10-CM

## 2023-01-04 DIAGNOSIS — M16.12 UNILATERAL PRIMARY OSTEOARTHRITIS, LEFT HIP: ICD-10-CM

## 2023-01-04 LAB
A1C WITH ESTIMATED AVERAGE GLUCOSE RESULT: 5.5 % — SIGNIFICANT CHANGE UP (ref 4–5.6)
ANION GAP SERPL CALC-SCNC: 12 MMOL/L — SIGNIFICANT CHANGE UP (ref 5–17)
APTT BLD: 27.2 SEC — LOW (ref 27.5–35.5)
BLD GP AB SCN SERPL QL: SIGNIFICANT CHANGE UP
BUN SERPL-MCNC: 25.3 MG/DL — HIGH (ref 8–20)
CALCIUM SERPL-MCNC: 9.1 MG/DL — SIGNIFICANT CHANGE UP (ref 8.4–10.5)
CHLORIDE SERPL-SCNC: 108 MMOL/L — SIGNIFICANT CHANGE UP (ref 96–108)
CO2 SERPL-SCNC: 23 MMOL/L — SIGNIFICANT CHANGE UP (ref 22–29)
CREAT SERPL-MCNC: 0.6 MG/DL — SIGNIFICANT CHANGE UP (ref 0.5–1.3)
EGFR: 96 ML/MIN/1.73M2 — SIGNIFICANT CHANGE UP
ESTIMATED AVERAGE GLUCOSE: 111 MG/DL — SIGNIFICANT CHANGE UP (ref 68–114)
GLUCOSE SERPL-MCNC: 108 MG/DL — HIGH (ref 70–99)
HCT VFR BLD CALC: 43 % — SIGNIFICANT CHANGE UP (ref 34.5–45)
HGB BLD-MCNC: 14.1 G/DL — SIGNIFICANT CHANGE UP (ref 11.5–15.5)
INR BLD: 1.09 RATIO — SIGNIFICANT CHANGE UP (ref 0.88–1.16)
MCHC RBC-ENTMCNC: 29.2 PG — SIGNIFICANT CHANGE UP (ref 27–34)
MCHC RBC-ENTMCNC: 32.8 GM/DL — SIGNIFICANT CHANGE UP (ref 32–36)
MCV RBC AUTO: 89 FL — SIGNIFICANT CHANGE UP (ref 80–100)
MRSA PCR RESULT.: SIGNIFICANT CHANGE UP
PLATELET # BLD AUTO: 339 K/UL — SIGNIFICANT CHANGE UP (ref 150–400)
POTASSIUM SERPL-MCNC: 4.4 MMOL/L — SIGNIFICANT CHANGE UP (ref 3.5–5.3)
POTASSIUM SERPL-SCNC: 4.4 MMOL/L — SIGNIFICANT CHANGE UP (ref 3.5–5.3)
PROTHROM AB SERPL-ACNC: 12.6 SEC — SIGNIFICANT CHANGE UP (ref 10.5–13.4)
RBC # BLD: 4.83 M/UL — SIGNIFICANT CHANGE UP (ref 3.8–5.2)
RBC # FLD: 13.7 % — SIGNIFICANT CHANGE UP (ref 10.3–14.5)
S AUREUS DNA NOSE QL NAA+PROBE: SIGNIFICANT CHANGE UP
SODIUM SERPL-SCNC: 143 MMOL/L — SIGNIFICANT CHANGE UP (ref 135–145)
WBC # BLD: 8.55 K/UL — SIGNIFICANT CHANGE UP (ref 3.8–10.5)
WBC # FLD AUTO: 8.55 K/UL — SIGNIFICANT CHANGE UP (ref 3.8–10.5)

## 2023-01-04 PROCEDURE — 93005 ELECTROCARDIOGRAM TRACING: CPT

## 2023-01-04 PROCEDURE — 93010 ELECTROCARDIOGRAM REPORT: CPT

## 2023-01-04 PROCEDURE — G0463: CPT

## 2023-01-04 RX ORDER — SERTRALINE 25 MG/1
1 TABLET, FILM COATED ORAL
Qty: 0 | Refills: 0 | DISCHARGE

## 2023-01-04 NOTE — H&P PST ADULT - NSICDXFAMILYHX_GEN_ALL_CORE_FT
Detail Level: Zone FAMILY HISTORY:  Mother  Still living? No  Family history of renal cancer, Age at diagnosis: Age Unknown     Continue Regimen: Benzaclin 1-5% Gel: AAA QAM {per pt, no refills needed at this time}; Tretinoin 0.1% Cream: AAA QHS {per pt, no refills needed at this time}; Doxycycline 100 mg: QHS w/ meal {refills sent to local pharmacy} Initiate Treatment: Clindamycin-benzoyl peroxide 1-5% gel: AAA QD Render In Strict Bullet Format?: No Plan: Patient to call when refills needed for Benzaclin/Tretinoin.

## 2023-01-04 NOTE — H&P PST ADULT - EKG AND INTERPRETATION
EKG reviewed, no acute changes, official reading pending. NSR 74, EKG reviewed, no acute changes, official reading pending.

## 2023-01-04 NOTE — H&P PST ADULT - MS GEN HX ROS MEA POS PC
Anesthesia Evaluation     Patient summary reviewed and Nursing notes reviewed   NPO Solid Status: > 6 hours  NPO Liquid Status: < 2 hours           Airway   Mallampati: II  TM distance: >3 FB  Neck ROM: full  No difficulty expected  Dental      Pulmonary - negative pulmonary ROS   Cardiovascular     (+) dysrhythmias Tachycardia,       Neuro/Psych  (+) headaches, psychiatric history Anxiety,     GI/Hepatic/Renal/Endo - negative ROS     Musculoskeletal (-) negative ROS    Abdominal    Substance History - negative use     OB/GYN    (+) Pregnant,         Other                        Anesthesia Plan    ASA 2     epidural       Anesthetic plan, all risks, benefits, and alternatives have been provided, discussed and informed consent has been obtained with: patient.  Use of blood products discussed with patient .      arthritis/joint pain

## 2023-01-04 NOTE — PHYSICAL EXAM
[de-identified] : The patient is conversive and in no apparent distress. The patient is alert and oriented to person, place, and time. Affect and mood appear normal. The head is normocephalic and atraumatic. Skin shows normal turgor with no evidence of eczema or psoriasis. No respiratory distress noted. Sensation grossly intact. MUSCULOSKELETAL:   SEE BELOW\par \par Left leg and knee exam demonstrates normal alignment of the knee.  The knee is normal temperature.  No effusions.  No tenderness to palpation.  No extensor mechanism lag.  No tenderness or defects of the quadriceps or patellar tendons.  Limitation is limited in a supine position because of hip pain.  In a seated position the patient has flexion greater than 100 degrees.  Left hip exam demonstrates no tenderness following the IT band or over the greater trochanteric bursa.  Passive range of motion of the left hip is markedly limited because of reproducible hip pain.  No calf tenderness.  Normal sensation to light touch distally.  2+ DP pulse.  No venous stasis or ulceration. [de-identified] : [facility], 2022 were reviewed.  Left knee implants are in good position.  Stemmed implants are noted.  No fractures or lytic lesions.  Pelvis and left hip x-rays were reviewed.  No hip fractures.  Advanced joint space narrowing centrally and laterally of the left hip appreciated.  Large acetabular osteophyte formation appreciated.\par \par CAT scan and Ultrasound from [facility] emergency department were reviewed.  No DVT was found on ultrasound.  CAT scan demonstrates advanced osteoarthritis of the left hip.  No fractures.

## 2023-01-04 NOTE — DISCUSSION/SUMMARY
[de-identified] : 30 minutes was spent reviewing the x-rays as well as discussing with the patient their clinical presentation, diagnosis and providing education.  All of the recent x-rays CAT scan and ultrasound imaging was reviewed with the patient.  The patient continues to remains clinically symptomatic.  The patient's pain is consistent with osteoarthritis of the left hip.  She is having difficulty with gentle motion and standing.  At this time she is recommended to proceed with a hip replacement to address the sudden change.  She will discontinue the use of ibuprofen in preparation for the hip surgery and spinal anesthesia.  She will obtain medical clearance.  She will limit her activity to reduce discomfort.\par \par The patient is a 72 year old individual with end stage arthritis of their left hip joint. Based upon the patient's continued symptoms and failure to respond to conservative treatment including continuous use of ibuprofen and activity modification, I have recommended a left anterior total hip arthroplasty for this patient. A long discussion took place with the patient describing what a total joint replacement is and what the procedure would entail. A hip model, similar to the implant that will be used during the operation, was utilized to demonstrate and to discuss the various bearing surfaces of the implants. The hospitalization and post-operative care and rehabilitation were also discussed. The use of perioperative antibiotics and DVT prophylaxis were discussed. The risk, benefits and alternatives to a surgical intervention were discussed at length with the patient. The patient was also advised of risks related to the medical comorbidities and elevated body mass index (BMI).  A lengthy discussion took place to review the most common complications including but not limited to: deep vein thrombosis, pulmonary embolus, heart attack, stroke, infection, wound breakdown, numbness, damage to nerves, tendon, muscles, arteries or other blood vessels, death and other possible complications from anesthesia. The patient was told that we will take steps to minimize these risks by using sterile technique, antibiotics and DVT prophylaxis when appropriate and follow the patient postoperatively in the office setting to monitor progress. The possibility of recurrent pain, no improvement in pain and actual worsening of pain were also discussed with the patient.\par The discharge plan of care focused on the patient going home following surgery.  The patient was encouraged to make the necessary arrangements to have someone stay with them when they are discharged home.  Following discharge, a home care nurse will visit the patient.  The home care nurse will open your home care case and request home physical therapy services.  Home physical therapy will commence following discharge provided it is appropriate and covered by the health insurance benefit plan. \par The benefits of surgery were discussed with the patient including the potential for improving his/her current clinical condition through operative intervention. Alternatives to surgical intervention including continued conservative management were also discussed in detail. All questions were answered to the satisfaction of the patient. The treatment plan of care, as well as a model of a total hip equivalent to the one that will be used for their total joint replacement, was shared with the patient.  The patient agreed to the plan of care as well as the use of implants in their total joint replacement.\par

## 2023-01-04 NOTE — H&P PST ADULT - ASSESSMENT
72 year old female with PMH Hypothyroidism, HTN, depression and anxiety here for PST states that  she has severe left hip pain. The pain started  right around Thanksgiving this past year. She describes the pain of the groin, thigh and radiating down towards the knee. She was seen in an urgent care. She had x-rays of the knee and the hip performed. She was referred to the emergency department at [facility] for evaluation because of concern for possible hip fracture. A CAT scan was performed. No hip fracture was found. Moderate degenerative changes of the hip are noted. Ultrasound of the left lower leg was performed and no DVT was found. The patient continues to have significant pain (10/10) with all the activities, She is taking Meloxicam now, She continues to have discomfort. She is scheduled for a Left Total Hip replacement anterior approach by Dr. Munoz on 23. Covid vaccine series completed, card in chart, (Pfizer X2) covid test is on 22. Medical Clearance pending     medications reviewed, instructions given on what medications to take and what not to take. Please take Levothyroxine in the AM of surgery,  last dose of Enalapril is the day before, 23 AM. All other meds can be continued till the night before surgery. pt is not taking ASA/Plavix/Anticoagulation medication at this time. Asked the pt not to take any NSAID's 5-7 days before surgery and told the pt Tylenol is okay to take for pain, pt verbalized understanding.     CAPRINI VTE 2.0 SCORE [CLOT updated 2019]    AGE RELATED RISK FACTORS                                                       MOBILITY RELATED FACTORS  [ ] Age 41-60 years                                            (1 Point)                    [ ] Bed rest                                                        (1 Point)  [x ] Age: 61-74 years                                           (2 Points)                  [ ] Plaster cast                                                   (2 Points)  [ ] Age= 75 years                                              (3 Points)                    [ ] Bed bound for more than 72 hours                 (2 Points)    DISEASE RELATED RISK FACTORS                                               GENDER SPECIFIC FACTORS  [ ] Edema in the lower extremities                       (1 Point)              [ ] Pregnancy                                                     (1 Point)  [ ] Varicose veins                                               (1 Point)                     [ ] Post-partum < 6 weeks                                   (1 Point)             [x ] BMI > 25 Kg/m2                                            (1 Point)                     [ ] Hormonal therapy  or oral contraception          (1 Point)                 [ ] Sepsis (in the previous month)                        (1 Point)               [ ] History of pregnancy complications                 (1 point)  [ ] Pneumonia or serious lung disease                                               [ ] Unexplained or recurrent                     (1 Point)           (in the previous month)                               (1 Point)  [ ] Abnormal pulmonary function test                     (1 Point)                 SURGERY RELATED RISK FACTORS  [ ] Acute myocardial infarction                              (1 Point)               [ ]  Section                                             (1 Point)  [ ] Congestive heart failure (in the previous month)  (1 Point)      [ ] Minor surgery                                                  (1 Point)   [ ] Inflammatory bowel disease                             (1 Point)               [ ] Arthroscopic surgery                                        (2 Points)  [ ] Central venous access                                      (2 Points)                [ ] General surgery lasting more than 45 minutes (2 points)  [ ] Malignancy- Present or previous                   (2 Points)                [x ] Elective arthroplasty                                         (5 points)    [ ] Stroke (in the previous month)                          (5 Points)                                                                                                                                                           HEMATOLOGY RELATED FACTORS                                                 TRAUMA RELATED RISK FACTORS  [ ] Prior episodes of VTE                                     (3 Points)                [ ] Fracture of the hip, pelvis, or leg                       (5 Points)  [ ] Positive family history for VTE                         (3 Points)             [ ] Acute spinal cord injury (in the previous month)  (5 Points)  [ ] Prothrombin 52403 A                                     (3 Points)               [ ] Paralysis  (less than 1 month)                             (5 Points)  [ ] Factor V Leiden                                             (3 Points)                  [ ] Multiple Trauma within 1 month                        (5 Points)  [ ] Lupus anticoagulants                                     (3 Points)                                                           [x ] Anticardiolipin antibodies                               (3 Points)                                                       [ ] High homocysteine in the blood                      (3 Points)                                             [ ] Other congenital or acquired thrombophilia      (3 Points)                                                [ ] Heparin induced thrombocytopenia                  (3 Points)                                     Total Score [    11     ]  OPIOID RISK TOOL    HECTOR EACH BOX THAT APPLIES AND ADD TOTALS AT THE END    FAMILY HISTORY OF SUBSTANCE ABUSE                 FEMALE         MALE                                                Alcohol                             [  ]1 pt          [  ]3pts                                               Illegal Drugs                     [  ]2 pts        [  ]3pts                                               Rx Drugs                           [  ]4 pts        [  ]4 pts    PERSONAL HISTORY OF SUBSTANCE ABUSE                                                                                          Alcohol                             [  ]3 pts       [  ]3 pts                                               Illegal Drugs                     [  ]4 pts        [  ]4 pts                                               Rx Drugs                           [  ]5 pts        [  ]5 pts    AGE BETWEEN 16-45 YEARS                                      [  ]1 pt         [  ]1 pt    HISTORY OF PREADOLESCENT   SEXUAL ABUSE                                                             [  ]3 pts        [  ]0pts    PSYCHOLOGICAL DISEASE                     ADD, OCD, Bipolar, Schizophrenia        [  ]2 pts         [  ]2 pts                      Depression                                               [  ]1 pt           [  ]1 pt           SCORING TOTAL   (add numbers and type here)              ( 0)                                     A score of 3 or lower indicated LOW risk for future opioid abuse  A score of 4 to 7 indicated moderate risk for future opioid abuse  A score of 8 or higher indicates a high risk for opioid abuse

## 2023-01-04 NOTE — HISTORY OF PRESENT ILLNESS
[de-identified] : Patient presents today with daughter for evaluation of left leg pain.  The patient reports of developing significant pain right around Thanksgiving this past year.  She does not recall of any injury or traumatic event causing the pain.  She describes the pain of the groin, thigh and radiating down towards the knee.  She was seen in urgent care.  She had x-rays of the knee and the hip performed.  She was referred to the emergency department at [facility] for evaluation because of concern for possible hip fracture.  A CAT scan was performed.  No hip fracture was found.  Moderate degenerative changes of the hip are noted.  Ultrasound of the left lower leg was performed and no DVT was found.  The patient continues to have significant pain.  She is limited in ambulation.  She is not using a cane or a walker.  She is taking ibuprofen around-the-clock.  She continues to have discomfort.  She is here today for evaluation.  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 \par Cardiovascular: No orthopnea or chest pain\par Pulmonary: No shortness of breath. \par GI: No nausea or vomiting or abdominal pain.\par Musculoskeletal: see HPI \par Psychiatric: No anxiety and depression.

## 2023-01-04 NOTE — H&P PST ADULT - HISTORY OF PRESENT ILLNESS
72 year old female here for PST states that presents today with daughter for evaluation of left leg pain. The patient reports of developing significant pain right around Thanksgiving this past year. She describes the pain of the groin, thigh and radiating down towards the knee. She was seen in urgent care. She had x-rays of the knee and the hip performed. She was referred to the emergency department at [facility] for evaluation because of concern for possible hip fracture. A CAT scan was performed. No hip fracture was found. Moderate degenerative changes of the hip are noted. Ultrasound of the left lower leg was performed and no DVT was found. The patient continues to have significant pain. She is limited in ambulation. She is not using a cane or a walker. She is taking ibuprofen around-the-clock. She continues to have discomfort. She is here today for evaluation. She is scheduled for a Left Total Hip replacement anterior appproach by Dr. Munoz on 1/12/23.  72 year old female with PMH Hypothyroidism, HTN, depression and anxiety here for PST states that  she has severe left hip pain. The pain started  right around Thanksgiving this past year. She describes the pain of the groin, thigh and radiating down towards the knee. She was seen in an urgent care. She had x-rays of the knee and the hip performed. She was referred to the emergency department at [facility] for evaluation because of concern for possible hip fracture. A CAT scan was performed. No hip fracture was found. Moderate degenerative changes of the hip are noted. Ultrasound of the left lower leg was performed and no DVT was found. The patient continues to have significant pain (10/10) with all the activities, She is taking Meloxicam now, She continues to have discomfort. She is scheduled for a Left Total Hip replacement anterior approach by Dr. Munoz on 1/12/23. Covid vaccine series completed, card in chart, (Pfizer X2) covid test is on 1/7/22. Medical Clearance pending

## 2023-01-04 NOTE — END OF VISIT
[FreeTextEntry3] : I, Dr. Munoz, personally performed the evaluation and management services for this established patient who presented today with a new problem/exacerbation of an existing condition. That E/M includes conducting the examination, assessing all new/exacerbated conditions, and establishing a new plan of care. Today, MY ACP was here to assist my evaluation and management services of this new problem/exacerbated condition to be followed going forward.\par

## 2023-01-04 NOTE — ADDENDUM
[FreeTextEntry1] : Patient was seen face to face and needs a commode for bedside use as the patient has no ability to access the bathroom in their home. The patient also requires a rolling walker as this is needed for activities of daily living within their home secondary to the diagnosis of osteoarthritis.

## 2023-01-04 NOTE — H&P PST ADULT - NSICDXPASTMEDICALHX_GEN_ALL_CORE_FT
PAST MEDICAL HISTORY:  Anticardiolipin antibody positive     Arthritis     Avascular necrosis left knee    Hypertension     Hypothyroid     Psoriasis     Urinary tract infection 1/31/16 finished bactrim     PAST MEDICAL HISTORY:  Anticardiolipin antibody positive     Avascular necrosis left knee    Hypertension     Hypothyroid     Psoriasis

## 2023-01-04 NOTE — H&P PST ADULT - PROBLEM SELECTOR PLAN 5
Left Total Hip replacement anterior approach by Dr. Munoz on 1/12/23. Covid vaccine series completed, card in chart, (Pfizer X2) covid test is on 1/7/22. Medical Clearance pending

## 2023-01-12 ENCOUNTER — APPOINTMENT (OUTPATIENT)
Dept: ORTHOPEDIC SURGERY | Facility: HOSPITAL | Age: 73
End: 2023-01-12

## 2023-01-27 ENCOUNTER — APPOINTMENT (OUTPATIENT)
Dept: ORTHOPEDIC SURGERY | Facility: CLINIC | Age: 73
End: 2023-01-27

## 2023-03-17 ENCOUNTER — APPOINTMENT (OUTPATIENT)
Dept: ORTHOPEDIC SURGERY | Facility: CLINIC | Age: 73
End: 2023-03-17

## 2025-01-23 NOTE — ED PROVIDER NOTE - CONSTITUTIONAL NEGATIVE STATEMENT, MLM
